# Patient Record
Sex: FEMALE
[De-identification: names, ages, dates, MRNs, and addresses within clinical notes are randomized per-mention and may not be internally consistent; named-entity substitution may affect disease eponyms.]

---

## 2019-02-15 ENCOUNTER — HOSPITAL ENCOUNTER (OUTPATIENT)
Dept: HOSPITAL 5 - OR | Age: 64
Discharge: HOME | End: 2019-02-15
Attending: PAIN MEDICINE
Payer: COMMERCIAL

## 2019-02-15 VITALS — DIASTOLIC BLOOD PRESSURE: 60 MMHG | SYSTOLIC BLOOD PRESSURE: 126 MMHG

## 2019-02-15 DIAGNOSIS — M51.36: Primary | ICD-10-CM

## 2019-02-15 DIAGNOSIS — Z79.899: ICD-10-CM

## 2019-02-15 DIAGNOSIS — Z98.890: ICD-10-CM

## 2019-02-15 DIAGNOSIS — M96.1: ICD-10-CM

## 2019-02-15 DIAGNOSIS — F41.9: ICD-10-CM

## 2019-02-15 DIAGNOSIS — F32.9: ICD-10-CM

## 2019-02-15 DIAGNOSIS — Z98.49: ICD-10-CM

## 2019-02-15 DIAGNOSIS — Z88.8: ICD-10-CM

## 2019-02-15 DIAGNOSIS — Z90.49: ICD-10-CM

## 2019-02-15 DIAGNOSIS — D64.9: ICD-10-CM

## 2019-02-15 PROCEDURE — 63685 INS/RPLC SPI NPG/RCVR POCKET: CPT

## 2019-02-15 PROCEDURE — 63650 IMPLANT NEUROELECTRODES: CPT

## 2019-02-15 PROCEDURE — 72070 X-RAY EXAM THORAC SPINE 2VWS: CPT

## 2019-02-15 PROCEDURE — C1713 ANCHOR/SCREW BN/BN,TIS/BN: HCPCS

## 2019-02-15 PROCEDURE — C1767 GENERATOR, NEURO NON-RECHARG: HCPCS

## 2019-02-15 NOTE — ANESTHESIA CONSULTATION
Anesthesia Consult and Med Hx


Date of service: 02/15/19





- Airway


Anesthetic Teeth Evaluation: Good


ROM Head & Neck: Adequate


Mental/Hyoid Distance: Adequate


Mallampati Class: Class II


Intubation Access Assessment: Good





- Pre-Operative Health Status


ASA Pre-Surgery Classification: ASA3


Proposed Anesthetic Plan: General





- Pulmonary


Hx Smoking: No


Hx Sleep Apnea: No (MIGUEL A PRE SCREEN LOW RISK.)





- Cardiovascular System


Hx Hypertension: No





- Central Nervous System


Hx Neuromuscular Disorder: Yes (Guillaine-Astor Syndrome)


Hx Back Pain: Yes (TO JACLYN LEGS)


Hx Psychiatric Problems: Yes (Depression, chronic pain)





- Hematic


Hx Anemia: Yes ( AND LOW IRON LEVELS)





- Other Systems


Hx Cancer: No

## 2019-02-18 NOTE — XRAY REPORT
THORACIC SPINE, 2 VIEWS



History: Post laminectomy syndrome.



Findings:



Fluoroscopy was provided by radiology during dorsal column stimulator 

insertion by Dr. Lindo. 2 fluoroscopic images are presented which 

demonstrate termination of the neurostimulator at the level of T7. 

Please correlate with the procedural report as needed.



Impression: Dorsal column stimulator placement as described.

## 2019-03-06 NOTE — OPERATIVE REPORT
Operative Report


Operative Report: 


Date of service:  2/15/2019.





PREOPERATIVE DIAGNOSIS:


1.  Chronic lower extremity radiculopathy.


2.  Chronic axial low back pain.


3.  Degenerative disc disease of lumbar spine.


4.  Postlaminectomy syndrome.





POSTOPERATIVE DIAGNOSIS:


1.  Chronic lower extremity radiculopathy.


2.  Chronic axial low back pain.


3.  Degenerative disc disease of lumbar spine.


4.  Postlaminectomy syndrome.





PROCEDURE PERFORMED:


1.  Spinal cord stimulator implantation with percutaneous insertion of dual 

epidural octapolar leadsthoracic spine.


2.  Implantation of permanent  dual .


2.  Programming of permanent neurostimulator [id number] minutes.





SURGEON:  MOISES BUSTILLOS M.D.





ANESTHESIA: General, 1% lidocaine local





EBL:  10 mL





DESCRIPTION OF PROCEDURE: Following informed consent, the patient was brought to

the operative suite and placed on the table a prone position.  General 

anesthesia was delivered without complication.  The back was sterilely prepped 

and draped in routine fashion.  Patient received 2 g of Ancef intravenously for 

antimicrobial prophylaxis.  A C-arm was brought into place with attention turned

to the lumbar spine on the right side.  Attention was turned toward the right 

T12-L1 interlaminar space.  A skin incision was made with a #11 blade at the 

level of L2-3 after injection of 1% lidocaine with epinephrine for local 

anesthesia.  A 22-gauge spinal needle was advanced to the L1 lamina with 

additional placement of 0.5% bupivacaine.  A 13-gauge Touhy needle was then 

advanced under fluoroscopic guidance to the right interlaminar space and the 

epidural space was entered using loss-of-resistance technique.  Aspiration was 

negative for blood and CSF.  A guidewire was easily passed into the dorsal 

epidural space.  This was followed by placement of a permanent 8 contact- 50 cm 

lead (StimWave) which was guided in the cephalad direction under fluoroscopic 

guidance to the lower aspect of T7 to the left of midline.  Position was 

confirmed in both the AP and lateral projections and appeared satisfactory. 





Using identical technique, a second 14-gauge Tuohy needle was advanced under 

fluoroscopic guidance parallel to the initial Touhy needle to the T12-L1 

interlaminar space.  The posterior epidural space was entered below the initial 

lead placement.  Aspiration was again negative for blood and CSF.  A second 

octapolar 50 cm permanent 8 contact lead was guided in a cephalad direction 

under fluoroscopic guidance to the superior aspect of T9 on the right.  Both 

leads were positioned in a paramedian location.  Position was again confirmed in

the AP and lateral projections which appeared satisfactory.





Small skin incisions was then made after injection of 1% lidocaine with 

epinephrine, at each needle entry points.   The tissues were sharply and bluntly

dissected down to the dorsal lumbar fascia.  The needles were removed.  

Following removal of the stylets, the fiber optic SCS  was carefully 

threaded into the lumen of each lead.  The sub fascial anchoring ([employer]PT[employer], 

[email or web address]) was initially placed over the left-sided lead, the device was deployed

anchoring the lead in satisfactory position.  Attention was next turned to the 

right-sided lead.  In identical fashion, the subfascial anchoring system was 

deployed.  During deployment, there was slight retraction of the lead to the mid

T9 level from the superior T9 endplate.  Lead position, however, appeared 

satisfactory.   A small transverse incision was made over L5-S1 on the right.  

The leads were then carefully tunneled within the subcutaneous tissue and the 

marker bands appear to be in satisfactory position.  The excess distal aspect of

each lead was removed and the distal aspects were then anchored to the dorsal 

lumbar fascia with 2-0 silk suture.  Position was checked with fluoroscopy. The 

wounds were then copiously irrigated with antibiotic solution.





Impedance of each lead and  was checked and found satisfactory.  The 

paramedian incision was closed in 2 layers with subcutaneous 3-0 Vicryl 

interrupted sutures and  3-0 Prolene interrupted sutures were utilized for skin 

approximation.  The distal transverse incision was closed in 2 layers with 2-0 

Vicryl interrupted deep sutures, and 3-0 Prolene interrupted skin sutures.  

Sterile dressings are placed over both incisions.  





Patient was then turned onto the [place] in the supine position and taken to the 

PACU where he was noted to be a stable cardiopulmonary neurologic condition.





The SCS system was then programmed.  Thirty minutes were spent during post 

implantation programming of the permanent system including frequency, pulse 

amplitude, pulse width, pulse duration and impedance check.  The patient was 

provided with 3 separate programs for use during the initial post implantation 

interval.





CONDITION AT DISCHARGE:  Good.





FOLLOW-UP: In office in 7 days for wound check.